# Patient Record
Sex: FEMALE | Race: WHITE | Employment: UNEMPLOYED | URBAN - METROPOLITAN AREA
[De-identification: names, ages, dates, MRNs, and addresses within clinical notes are randomized per-mention and may not be internally consistent; named-entity substitution may affect disease eponyms.]

---

## 2022-04-21 LAB
ANTIBODY SCREEN, EXTERNAL: NEGATIVE
CHLAMYDIA, EXTERNAL: NEGATIVE
HBSAG, EXTERNAL: NON REACTIVE
N. GONORRHEA, EXTERNAL: NEGATIVE
RUBELLA, EXTERNAL: NORMAL
T. PALLIDUM, EXTERNAL: NON REACTIVE
TYPE, ABO & RH, EXTERNAL: NORMAL

## 2022-10-26 LAB — GRBS, EXTERNAL: NEGATIVE

## 2022-11-21 ENCOUNTER — HOSPITAL ENCOUNTER (INPATIENT)
Age: 37
LOS: 2 days | Discharge: HOME | DRG: 560 | End: 2022-11-24
Attending: OBSTETRICS & GYNECOLOGY | Admitting: OBSTETRICS & GYNECOLOGY
Payer: COMMERCIAL

## 2022-11-21 PROCEDURE — 75810000275 HC EMERGENCY DEPT VISIT NO LEVEL OF CARE

## 2022-11-22 ENCOUNTER — ANESTHESIA EVENT (OUTPATIENT)
Dept: LABOR AND DELIVERY | Age: 37
DRG: 560 | End: 2022-11-22
Payer: COMMERCIAL

## 2022-11-22 ENCOUNTER — ANESTHESIA (OUTPATIENT)
Dept: LABOR AND DELIVERY | Age: 37
DRG: 560 | End: 2022-11-22
Payer: COMMERCIAL

## 2022-11-22 PROBLEM — Z34.90 PREGNANCY: Status: ACTIVE | Noted: 2022-11-22

## 2022-11-22 LAB
ABO + RH BLD: NORMAL
BASOPHILS # BLD: 0 K/UL (ref 0–0.1)
BASOPHILS NFR BLD: 0 % (ref 0–1)
BLOOD GROUP ANTIBODIES SERPL: NORMAL
DIFFERENTIAL METHOD BLD: ABNORMAL
EOSINOPHIL # BLD: 0.2 K/UL (ref 0–0.4)
EOSINOPHIL NFR BLD: 1 % (ref 0–7)
ERYTHROCYTE [DISTWIDTH] IN BLOOD BY AUTOMATED COUNT: 13.5 % (ref 11.5–14.5)
HCT VFR BLD AUTO: 33.3 % (ref 35–47)
HGB BLD-MCNC: 10.5 G/DL (ref 11.5–16)
IMM GRANULOCYTES # BLD AUTO: 0.1 K/UL (ref 0–0.04)
IMM GRANULOCYTES NFR BLD AUTO: 1 % (ref 0–0.5)
LYMPHOCYTES # BLD: 2.4 K/UL (ref 0.8–3.5)
LYMPHOCYTES NFR BLD: 17 % (ref 12–49)
MCH RBC QN AUTO: 25.9 PG (ref 26–34)
MCHC RBC AUTO-ENTMCNC: 31.5 G/DL (ref 30–36.5)
MCV RBC AUTO: 82 FL (ref 80–99)
MONOCYTES # BLD: 0.9 K/UL (ref 0–1)
MONOCYTES NFR BLD: 6 % (ref 5–13)
NEUTS SEG # BLD: 10.4 K/UL (ref 1.8–8)
NEUTS SEG NFR BLD: 75 % (ref 32–75)
NRBC # BLD: 0 K/UL (ref 0–0.01)
NRBC BLD-RTO: 0 PER 100 WBC
PLATELET # BLD AUTO: 381 K/UL (ref 150–400)
PMV BLD AUTO: 9.9 FL (ref 8.9–12.9)
RBC # BLD AUTO: 4.06 M/UL (ref 3.8–5.2)
SPECIMEN EXP DATE BLD: NORMAL
WBC # BLD AUTO: 14 K/UL (ref 3.6–11)

## 2022-11-22 PROCEDURE — G0378 HOSPITAL OBSERVATION PER HR: HCPCS

## 2022-11-22 PROCEDURE — 75410000002 HC LABOR FEE PER 1 HR

## 2022-11-22 PROCEDURE — 74011000250 HC RX REV CODE- 250: Performed by: ADVANCED PRACTICE MIDWIFE

## 2022-11-22 PROCEDURE — 74011250637 HC RX REV CODE- 250/637: Performed by: OBSTETRICS & GYNECOLOGY

## 2022-11-22 PROCEDURE — 75410000003 HC RECOV DEL/VAG/CSECN EA 0.5 HR

## 2022-11-22 PROCEDURE — 74011250636 HC RX REV CODE- 250/636: Performed by: ADVANCED PRACTICE MIDWIFE

## 2022-11-22 PROCEDURE — 0HQ9XZZ REPAIR PERINEUM SKIN, EXTERNAL APPROACH: ICD-10-PCS | Performed by: OBSTETRICS & GYNECOLOGY

## 2022-11-22 PROCEDURE — 77030014125 HC TY EPDRL BBMI -B: Performed by: STUDENT IN AN ORGANIZED HEALTH CARE EDUCATION/TRAINING PROGRAM

## 2022-11-22 PROCEDURE — 65410000002 HC RM PRIVATE OB

## 2022-11-22 PROCEDURE — 77030021125

## 2022-11-22 PROCEDURE — 36415 COLL VENOUS BLD VENIPUNCTURE: CPT

## 2022-11-22 PROCEDURE — 74011000250 HC RX REV CODE- 250: Performed by: STUDENT IN AN ORGANIZED HEALTH CARE EDUCATION/TRAINING PROGRAM

## 2022-11-22 PROCEDURE — 74011250636 HC RX REV CODE- 250/636: Performed by: STUDENT IN AN ORGANIZED HEALTH CARE EDUCATION/TRAINING PROGRAM

## 2022-11-22 PROCEDURE — 99285 EMERGENCY DEPT VISIT HI MDM: CPT

## 2022-11-22 PROCEDURE — 75410000000 HC DELIVERY VAGINAL/SINGLE

## 2022-11-22 PROCEDURE — 74011250636 HC RX REV CODE- 250/636: Performed by: OBSTETRICS & GYNECOLOGY

## 2022-11-22 PROCEDURE — 76060000078 HC EPIDURAL ANESTHESIA

## 2022-11-22 PROCEDURE — 85025 COMPLETE CBC W/AUTO DIFF WBC: CPT

## 2022-11-22 PROCEDURE — 77030003666 HC NDL SPINAL BD -A: Performed by: STUDENT IN AN ORGANIZED HEALTH CARE EDUCATION/TRAINING PROGRAM

## 2022-11-22 PROCEDURE — 74011000250 HC RX REV CODE- 250

## 2022-11-22 PROCEDURE — 86900 BLOOD TYPING SEROLOGIC ABO: CPT

## 2022-11-22 PROCEDURE — 4A1HXCZ MONITORING OF PRODUCTS OF CONCEPTION, CARDIAC RATE, EXTERNAL APPROACH: ICD-10-PCS | Performed by: OBSTETRICS & GYNECOLOGY

## 2022-11-22 RX ORDER — BUPIVACAINE HYDROCHLORIDE 2.5 MG/ML
INJECTION, SOLUTION EPIDURAL; INFILTRATION; INTRACAUDAL
Status: COMPLETED
Start: 2022-11-22 | End: 2022-11-22

## 2022-11-22 RX ORDER — NALOXONE HYDROCHLORIDE 0.4 MG/ML
0.4 INJECTION, SOLUTION INTRAMUSCULAR; INTRAVENOUS; SUBCUTANEOUS AS NEEDED
Status: DISCONTINUED | OUTPATIENT
Start: 2022-11-22 | End: 2022-11-22 | Stop reason: HOSPADM

## 2022-11-22 RX ORDER — SODIUM CHLORIDE 0.9 % (FLUSH) 0.9 %
5-40 SYRINGE (ML) INJECTION EVERY 8 HOURS
Status: DISCONTINUED | OUTPATIENT
Start: 2022-11-22 | End: 2022-11-22 | Stop reason: HOSPADM

## 2022-11-22 RX ORDER — NORETHINDRONE AND ETHINYL ESTRADIOL 0.5-0.035
12.5 KIT ORAL AS NEEDED
Status: DISCONTINUED | OUTPATIENT
Start: 2022-11-22 | End: 2022-11-22 | Stop reason: HOSPADM

## 2022-11-22 RX ORDER — OXYTOCIN/RINGER'S LACTATE 30/500 ML
1-25 PLASTIC BAG, INJECTION (ML) INTRAVENOUS
Status: DISCONTINUED | OUTPATIENT
Start: 2022-11-22 | End: 2022-11-24 | Stop reason: HOSPADM

## 2022-11-22 RX ORDER — DIPHENHYDRAMINE HCL 25 MG
25 CAPSULE ORAL
Status: DISCONTINUED | OUTPATIENT
Start: 2022-11-22 | End: 2022-11-24 | Stop reason: HOSPADM

## 2022-11-22 RX ORDER — AMMONIA 15 % (W/V)
1 AMPUL (EA) INHALATION AS NEEDED
Status: DISCONTINUED | OUTPATIENT
Start: 2022-11-22 | End: 2022-11-24 | Stop reason: HOSPADM

## 2022-11-22 RX ORDER — FENTANYL CITRATE 50 UG/ML
INJECTION, SOLUTION INTRAMUSCULAR; INTRAVENOUS AS NEEDED
Status: DISCONTINUED | OUTPATIENT
Start: 2022-11-22 | End: 2022-11-22 | Stop reason: HOSPADM

## 2022-11-22 RX ORDER — ONDANSETRON 2 MG/ML
4 INJECTION INTRAMUSCULAR; INTRAVENOUS
Status: DISCONTINUED | OUTPATIENT
Start: 2022-11-22 | End: 2022-11-22 | Stop reason: HOSPADM

## 2022-11-22 RX ORDER — LANOLIN ALCOHOL/MO/W.PET/CERES
3 CREAM (GRAM) TOPICAL
Status: DISCONTINUED | OUTPATIENT
Start: 2022-11-22 | End: 2022-11-24 | Stop reason: HOSPADM

## 2022-11-22 RX ORDER — NALBUPHINE HYDROCHLORIDE 20 MG/ML
5 INJECTION, SOLUTION INTRAMUSCULAR; INTRAVENOUS; SUBCUTANEOUS
Status: DISCONTINUED | OUTPATIENT
Start: 2022-11-22 | End: 2022-11-22 | Stop reason: HOSPADM

## 2022-11-22 RX ORDER — NORETHINDRONE AND ETHINYL ESTRADIOL 0.5-0.035
KIT ORAL
Status: COMPLETED
Start: 2022-11-22 | End: 2022-11-22

## 2022-11-22 RX ORDER — LIDOCAINE HYDROCHLORIDE AND EPINEPHRINE 15; 5 MG/ML; UG/ML
INJECTION, SOLUTION EPIDURAL
Status: COMPLETED | OUTPATIENT
Start: 2022-11-22 | End: 2022-11-22

## 2022-11-22 RX ORDER — OXYCODONE AND ACETAMINOPHEN 5; 325 MG/1; MG/1
1 TABLET ORAL
Status: DISCONTINUED | OUTPATIENT
Start: 2022-11-22 | End: 2022-11-24 | Stop reason: HOSPADM

## 2022-11-22 RX ORDER — SODIUM CHLORIDE 0.9 % (FLUSH) 0.9 %
5-40 SYRINGE (ML) INJECTION AS NEEDED
Status: DISCONTINUED | OUTPATIENT
Start: 2022-11-22 | End: 2022-11-22 | Stop reason: HOSPADM

## 2022-11-22 RX ORDER — SIMETHICONE 80 MG
80 TABLET,CHEWABLE ORAL
Status: DISCONTINUED | OUTPATIENT
Start: 2022-11-22 | End: 2022-11-24 | Stop reason: HOSPADM

## 2022-11-22 RX ORDER — IBUPROFEN 400 MG/1
800 TABLET ORAL EVERY 8 HOURS
Status: DISCONTINUED | OUTPATIENT
Start: 2022-11-22 | End: 2022-11-24 | Stop reason: HOSPADM

## 2022-11-22 RX ORDER — BUPIVACAINE HYDROCHLORIDE 2.5 MG/ML
INJECTION, SOLUTION EPIDURAL; INFILTRATION; INTRACAUDAL AS NEEDED
Status: DISCONTINUED | OUTPATIENT
Start: 2022-11-22 | End: 2022-11-22 | Stop reason: HOSPADM

## 2022-11-22 RX ORDER — SODIUM CHLORIDE 0.9 % (FLUSH) 0.9 %
5-40 SYRINGE (ML) INJECTION AS NEEDED
Status: DISCONTINUED | OUTPATIENT
Start: 2022-11-22 | End: 2022-11-24 | Stop reason: HOSPADM

## 2022-11-22 RX ORDER — HYDROCORTISONE 1 %
CREAM (GRAM) TOPICAL AS NEEDED
Status: DISCONTINUED | OUTPATIENT
Start: 2022-11-22 | End: 2022-11-24 | Stop reason: HOSPADM

## 2022-11-22 RX ORDER — NALBUPHINE HYDROCHLORIDE 20 MG/ML
10 INJECTION, SOLUTION INTRAMUSCULAR; INTRAVENOUS; SUBCUTANEOUS
Status: DISCONTINUED | OUTPATIENT
Start: 2022-11-22 | End: 2022-11-22 | Stop reason: HOSPADM

## 2022-11-22 RX ORDER — SODIUM CHLORIDE 0.9 % (FLUSH) 0.9 %
5-40 SYRINGE (ML) INJECTION EVERY 8 HOURS
Status: DISCONTINUED | OUTPATIENT
Start: 2022-11-22 | End: 2022-11-24 | Stop reason: HOSPADM

## 2022-11-22 RX ORDER — ACETAMINOPHEN 325 MG/1
650 TABLET ORAL
Status: DISCONTINUED | OUTPATIENT
Start: 2022-11-22 | End: 2022-11-24 | Stop reason: HOSPADM

## 2022-11-22 RX ORDER — OXYTOCIN/RINGER'S LACTATE 30/500 ML
87.3 PLASTIC BAG, INJECTION (ML) INTRAVENOUS AS NEEDED
Status: DISCONTINUED | OUTPATIENT
Start: 2022-11-22 | End: 2022-11-22 | Stop reason: HOSPADM

## 2022-11-22 RX ORDER — BUPIVACAINE HYDROCHLORIDE 2.5 MG/ML
INJECTION, SOLUTION EPIDURAL; INFILTRATION; INTRACAUDAL
Status: COMPLETED | OUTPATIENT
Start: 2022-11-22 | End: 2022-11-22

## 2022-11-22 RX ORDER — DOCUSATE SODIUM 100 MG/1
100 CAPSULE, LIQUID FILLED ORAL
Status: DISCONTINUED | OUTPATIENT
Start: 2022-11-22 | End: 2022-11-24 | Stop reason: HOSPADM

## 2022-11-22 RX ORDER — HYDROCORTISONE ACETATE PRAMOXINE HCL 2.5; 1 G/100G; G/100G
CREAM TOPICAL AS NEEDED
Status: DISCONTINUED | OUTPATIENT
Start: 2022-11-22 | End: 2022-11-24 | Stop reason: HOSPADM

## 2022-11-22 RX ORDER — OXYTOCIN/RINGER'S LACTATE 30/500 ML
10 PLASTIC BAG, INJECTION (ML) INTRAVENOUS AS NEEDED
Status: DISCONTINUED | OUTPATIENT
Start: 2022-11-22 | End: 2022-11-24 | Stop reason: HOSPADM

## 2022-11-22 RX ORDER — SODIUM CHLORIDE, SODIUM LACTATE, POTASSIUM CHLORIDE, CALCIUM CHLORIDE 600; 310; 30; 20 MG/100ML; MG/100ML; MG/100ML; MG/100ML
125 INJECTION, SOLUTION INTRAVENOUS CONTINUOUS
Status: DISCONTINUED | OUTPATIENT
Start: 2022-11-22 | End: 2022-11-22 | Stop reason: HOSPADM

## 2022-11-22 RX ORDER — MAG HYDROX/ALUMINUM HYD/SIMETH 200-200-20
30 SUSPENSION, ORAL (FINAL DOSE FORM) ORAL
Status: DISCONTINUED | OUTPATIENT
Start: 2022-11-22 | End: 2022-11-22 | Stop reason: HOSPADM

## 2022-11-22 RX ORDER — OXYTOCIN/RINGER'S LACTATE 30/500 ML
10 PLASTIC BAG, INJECTION (ML) INTRAVENOUS AS NEEDED
Status: COMPLETED | OUTPATIENT
Start: 2022-11-22 | End: 2022-11-22

## 2022-11-22 RX ORDER — FENTANYL CITRATE 50 UG/ML
INJECTION, SOLUTION INTRAMUSCULAR; INTRAVENOUS
Status: COMPLETED
Start: 2022-11-22 | End: 2022-11-22

## 2022-11-22 RX ORDER — OXYTOCIN/RINGER'S LACTATE 30/500 ML
87.3 PLASTIC BAG, INJECTION (ML) INTRAVENOUS AS NEEDED
Status: DISCONTINUED | OUTPATIENT
Start: 2022-11-22 | End: 2022-11-24 | Stop reason: HOSPADM

## 2022-11-22 RX ORDER — FENTANYL/BUPIVACAINE/NS/PF 2-1250MCG
1-16 PREFILLED PUMP RESERVOIR EPIDURAL CONTINUOUS
Status: DISCONTINUED | OUTPATIENT
Start: 2022-11-22 | End: 2022-11-22 | Stop reason: HOSPADM

## 2022-11-22 RX ADMIN — SODIUM CHLORIDE, POTASSIUM CHLORIDE, SODIUM LACTATE AND CALCIUM CHLORIDE 1000 ML: 600; 310; 30; 20 INJECTION, SOLUTION INTRAVENOUS at 00:00

## 2022-11-22 RX ADMIN — OXYTOCIN 10000 MILLI-UNITS: 10 INJECTION, SOLUTION INTRAMUSCULAR; INTRAVENOUS at 17:09

## 2022-11-22 RX ADMIN — IBUPROFEN 800 MG: 400 TABLET, FILM COATED ORAL at 18:45

## 2022-11-22 RX ADMIN — SODIUM CHLORIDE, PRESERVATIVE FREE 10 ML: 5 INJECTION INTRAVENOUS at 02:00

## 2022-11-22 RX ADMIN — Medication 25 MG: at 03:05

## 2022-11-22 RX ADMIN — Medication 10 ML/HR: at 06:44

## 2022-11-22 RX ADMIN — Medication 2 MILLI-UNITS/MIN: at 11:52

## 2022-11-22 RX ADMIN — EPHEDRINE SULFATE 12.5 MG: 50 INJECTION INTRAVENOUS at 05:49

## 2022-11-22 RX ADMIN — FENTANYL CITRATE 100 MCG: 50 INJECTION, SOLUTION INTRAMUSCULAR; INTRAVENOUS at 05:35

## 2022-11-22 RX ADMIN — ONDANSETRON 4 MG: 2 INJECTION INTRAMUSCULAR; INTRAVENOUS at 05:48

## 2022-11-22 RX ADMIN — Medication 10 ML/HR: at 14:11

## 2022-11-22 RX ADMIN — NORETHINDRONE AND ETHINYL ESTRADIOL 12.5 MG: KIT ORAL at 05:49

## 2022-11-22 RX ADMIN — SODIUM CHLORIDE, POTASSIUM CHLORIDE, SODIUM LACTATE AND CALCIUM CHLORIDE 125 ML/HR: 600; 310; 30; 20 INJECTION, SOLUTION INTRAVENOUS at 02:00

## 2022-11-22 RX ADMIN — LIDOCAINE HYDROCHLORIDE,EPINEPHRINE BITARTRATE 2 ML: 15; .005 INJECTION, SOLUTION EPIDURAL; INFILTRATION; INTRACAUDAL; PERINEURAL at 05:35

## 2022-11-22 RX ADMIN — NALBUPHINE HYDROCHLORIDE 10 MG: 20 INJECTION, SOLUTION INTRAMUSCULAR; INTRAVENOUS; SUBCUTANEOUS at 03:05

## 2022-11-22 RX ADMIN — SODIUM CHLORIDE, POTASSIUM CHLORIDE, SODIUM LACTATE AND CALCIUM CHLORIDE 125 ML/HR: 600; 310; 30; 20 INJECTION, SOLUTION INTRAVENOUS at 12:44

## 2022-11-22 RX ADMIN — BUPIVACAINE HYDROCHLORIDE 2 ML: 2.5 INJECTION, SOLUTION EPIDURAL; INFILTRATION; INTRACAUDAL; PERINEURAL at 05:35

## 2022-11-22 RX ADMIN — SODIUM CHLORIDE, POTASSIUM CHLORIDE, SODIUM LACTATE AND CALCIUM CHLORIDE 125 ML/HR: 600; 310; 30; 20 INJECTION, SOLUTION INTRAVENOUS at 09:00

## 2022-11-22 RX ADMIN — BUPIVACAINE HYDROCHLORIDE 0.5 ML: 2.5 INJECTION, SOLUTION EPIDURAL; INFILTRATION; INTRACAUDAL at 05:35

## 2022-11-22 NOTE — H&P
Observation History and Physical    Patient: Sami Estrada MRN: [id number]  SSN: [id number]    YOB: 1985  Age: 40 y.o. Sex: female      Subjective:      Sami Estrada is a 41 yo  at 39w5d with an SEYMOUR of 22. She presents to observation for therapeutic rest. Was seen in the [facility] for contractions and SVE was unchanged after 2 hours. Reports she has had contractions over last three nights. Ctx have been irregular, sometimes 10 to 45 min in between. Tonight they have gotten more regular, about every 7 min and an 8/10 on the pain scale. Denies LOF and VB. Endorses good fetal movement. Prenatal care has been received at [address] with Dr Denise King. Pregnancy complicated by a history of a  with G1 at 36 weeks for fetal distress (pt was also COVID positive at the time). Pt desires TOLAC, has been educated in office on risks (including but not limited to maternal and/or fetal death) and desires to proceed with TOLAC. Pt also with a history of postpartum depression, depression and anxiety, not currently on any medications for management. Pt is also AMA- declined genetic testing with this pregnancy. Pt is rubella non immune and will need MMR postpartum. Pt also anemic- not taking supplement. Past Medical History:   Diagnosis Date    Anemia     no t taking iron    Postpartum depression     Trauma      Past Surgical History:   Procedure Laterality Date    HX  SECTION        Family History   Problem Relation Age of Onset    No Known Problems Mother     No Known Problems Father      Social History     Tobacco Use    Smoking status: Never     Passive exposure: Never    Smokeless tobacco: Not on file   Substance Use Topics    Alcohol use: Never      Prior to Admission medications    Medication Sig Start Date End Date Taking? Authorizing Provider   prenatal vit no.124/iron/folic (PRENATAL VITAMIN PO) Take  by mouth.    Yes Provider, Historical        Allergies   Allergen Reactions    Sulfa (Sulfonamide Antibiotics) Rash        Review of Systems   Constitutional: Negative. HENT: Negative. Eyes: Negative. Respiratory: Negative. Cardiovascular: Negative. Gastrointestinal:  Positive for abdominal pain. Endocrine: Negative. Genitourinary: Negative. Musculoskeletal: Negative. Skin: Negative. Allergic/Immunologic: Negative. Neurological: Negative. Hematological: Negative. Psychiatric/Behavioral: Negative. Objective:     Vitals:    11/21/22 2257   BP: 127/85   Pulse: 90   Resp: 16   Temp: 97.9 °F (36.6 °C)   SpO2: 97%   Weight: 78.5 kg (173 lb)   Height: 5' 5\" (1.651 m)           Physical Exam  Vitals and nursing note reviewed. Exam conducted with a chaperone present. Constitutional:       Appearance: Normal appearance. She is normal weight. HENT:      Head: Normocephalic and atraumatic. Nose: Nose normal.      Mouth/Throat:      Mouth: Mucous membranes are moist.      Pharynx: Oropharynx is clear. Eyes:      Extraocular Movements: Extraocular movements intact. Cardiovascular:      Rate and Rhythm: Normal rate and regular rhythm. Pulses: Normal pulses. Heart sounds: Normal heart sounds. Pulmonary:      Effort: Pulmonary effort is normal.      Breath sounds: Normal breath sounds. Abdominal:      Comments: Ctx mild to palpation, resting tone soft   Genitourinary:     Comments: SVE: posterior, 3/60/-2, intact, vertex     Repeat SVE unchanged  Musculoskeletal:         General: Normal range of motion. Cervical back: Normal range of motion and neck supple. Skin:     General: Skin is warm and dry. Capillary Refill: Capillary refill takes less than 2 seconds. Neurological:      General: No focal deficit present. Mental Status: She is alert and oriented to person, place, and time. Mental status is at baseline.    Psychiatric:         Mood and Affect: Mood normal.         Behavior: Behavior normal. Thought Content: Thought content normal.         Judgment: Judgment normal.      NST: Monitored for 40 minutes, reactive, cat 1, baseline 135, positive accels, no decels, moderate variability, ctx q 2-5 min, strong to palpation, resting tone soft    Assessment:     Hospital Problems  Never Reviewed            Codes Class Noted POA    Pregnancy ICD-10-CM: Z34.90  ICD-9-CM: V22.2  2022 Unknown         Maternal exhaustion  Prodromal labor  Hx     Plan:     Admit to observation  IV, Phenergan/nubain for therapeutic rest.  Recheck cervix in AM and determine POC.   Continuous fetal and toco monitoring  Maternal vitals per protocol    Signed By: Ivan Ann CNM     2022

## 2022-11-22 NOTE — ED TRIAGE NOTES
2256: Patient of Valeria Casanova MD (, 39w6d) arrives ambulatory with significant other with complaints of contractions that started three nights ago; however have intensified and increased in regularity since this evening. Patient denies loss of fluid or vaginal bleeding, RUQ pain, visual disturbances or headaches at this time. Patient endorses fetal movement. VSS. Patient oriented to room and call bell within reach. 2300: CNM made aware of patient arrival.    2310: CNM at bedside. SVE remains unchanged (3/60/-2). Orders to give 1 L of fluid and PO hydration and recheck cervix after two hours. Continue with continuous monitoring. 0125: CNM at bedside. SVE remains unchanged. CNM discussing whether patient to be discharged home or admit for therapeutic rest. Patient desires to be admitted for therapeutic rest and repeat SVE in AM.    0247: Patient transferred to LD 6 for therapeutic rest. This RN to resume care. 6670: Phenergan and Nubain given. (See MAR). 0400: Patient resting comfortably at this time. 46: Patient in increased pain that is \"unbearable\". RN at bedside educating patient on IV medication vs epidural placement. Patient decides on epidural placement at this time. Patient up to void and IV fluid bolus begun at this time. 0500Prudence MD Cassie made aware. 2671Prudenlina Matthews MD at bedside for epidural placement. Timeout complete. 0540: Epidural in place. Patient tolerated well. BP cycling per unit routine. 5730: Patient placed on L side. 56: Patient reports gush of fluid. Nitrazine positive. Clear moderate amount of fluid.    0600: Patient placed on R side. 0700: Patient turned on L side due to FHR.    0701: IV fluid bolus begun for FHR.    0702: Patient in hands and knees due to FHR.    0703: Oxygen started due to [address].    0705: SVE: /-1 by Clinton Roy RN.    0715: RN remaining at bedside d/t FHR. 0730:  Bedside and Verbal shift change report given to Christopher Hirsch RN (oncoming nurse) by Ariadne Cespedes RN (offgoing nurse). Report included the following information SBAR, Kardex, Intake/Output, MAR, and Recent Results.

## 2022-11-22 NOTE — ANESTHESIA POSTPROCEDURE EVALUATION
Post-Anesthesia Evaluation and Assessment    Patient: Aisha Cantu MRN: [id number]  SSN: [id number]    YOB: 1985  Age: 40 y.o. Sex: female      I have evaluated the patient and they are stable and ready for discharge from the PACU. Cardiovascular Function/Vital Signs  Visit Vitals  /61   Pulse (!) 120   Temp 36.7 °C (98 °F)   Resp 16   Ht 5' 5\" (1.651 m)   Wt 78.5 kg (173 lb)   SpO2 100%   Breastfeeding No   BMI 28.79 kg/m²       Patient is status post * No anesthesia type entered * anesthesia for * No procedures listed *. Nausea/Vomiting: None    Postoperative hydration reviewed and adequate. Pain:  Pain Scale 1: Numeric (0 - 10) (11/22/22 1718)  Pain Intensity 1: 0 (11/22/22 1817)   Managed    Neurological Status:   Neuro (WDL): Within Defined Limits (11/22/22 1718)   At baseline    Mental Status, Level of Consciousness: Alert and  oriented to person, place, and time    Pulmonary Status:   O2 Device: None (Room air) (11/21/22 2257)   Adequate oxygenation and airway patent    Complications related to anesthesia: None    Post-anesthesia assessment completed. No concerns      Signed By: Gene Fry DO     November 22, 2022                * No procedures listed *.     CSE    <BSHSIANPOST>    INITIAL Post-op Vital signs:   Vitals Value Taken Time   /61 11/22/22 1832   Temp     Pulse 120 11/22/22 1832   Resp     SpO2

## 2022-11-22 NOTE — H&P
Inpatient History and Physical    Patient: Chandu Nath MRN: [id number]  SSN: [id number]    YOB: 1985  Age: 40 y.o. Sex: female      Late entry due to busy unit    Subjective:      Chandu Nath is a 41 yo  at 39w5d with an SEYMOUR of 22. She is being admitted to inpatient from observation for labor. Pt was in observation for therapeutic rest. Because see was seen in the [facility] for contractions and SVE was unchanged after 2 hours. Pt was able to get a brief amount of rest. Now pt is more active and would like epidural. Reports she has had contractions over last three nights. Ctx have been irregular, sometimes 10 to 45 min in between. Tonight they have gotten more regular, about every 7 min and an 8/10 on the pain scale. Denies LOF and VB. Endorses good fetal movement. Prenatal care has been received at [facility] with Dr Osmar Willams. Pregnancy complicated by a history of a  with G1 at 36 weeks for fetal distress (pt was also COVID positive at the time). Pt desires TOLAC, has been educated in office on risks (including but not limited to maternal and/or fetal death) and desires to proceed with TOLAC. Pt also with a history of postpartum depression, depression and anxiety, not currently on any medications for management. Pt is also AMA- declined genetic testing with this pregnancy. Pt is rubella non immune and will need MMR postpartum. Pt also anemic- not taking supplement.     Past Medical History:   Diagnosis Date    Anemia     no t taking iron    Postpartum depression     Trauma      Past Surgical History:   Procedure Laterality Date    HX  SECTION        Family History   Problem Relation Age of Onset    No Known Problems Mother     No Known Problems Father      Social History     Tobacco Use    Smoking status: Never     Passive exposure: Never    Smokeless tobacco: Not on file   Substance Use Topics    Alcohol use: Never      Prior to Admission medications    Medication Sig Start Date End Date Taking? Authorizing Provider   prenatal vit no.124/iron/folic (PRENATAL VITAMIN PO) Take  by mouth. Yes Provider, Historical        Allergies   Allergen Reactions    Sulfa (Sulfonamide Antibiotics) Rash        Review of Systems   Constitutional: Negative. HENT: Negative. Eyes: Negative. Respiratory: Negative. Cardiovascular: Negative. Gastrointestinal:  Positive for abdominal pain. Endocrine: Negative. Genitourinary: Negative. Musculoskeletal: Negative. Skin: Negative. Allergic/Immunologic: Negative. Neurological: Negative. Hematological: Negative. Psychiatric/Behavioral: Negative. Objective:     Vitals:    11/22/22 0609 11/22/22 0624 11/22/22 0625 11/22/22 0629   BP: (!) 108/51 (!) 103/58 (!) 101/52 103/63   Pulse: (!) 108 87 93 (!) 108   Resp:       Temp:       SpO2: 100% 98%  100%   Weight:       Height:            Physical Exam  Vitals and nursing note reviewed. Exam conducted with a chaperone present. Constitutional:       Appearance: Normal appearance. She is normal weight. HENT:      Head: Normocephalic and atraumatic. Nose: Nose normal.      Mouth/Throat:      Mouth: Mucous membranes are moist.      Pharynx: Oropharynx is clear. Eyes:      Extraocular Movements: Extraocular movements intact. Cardiovascular:      Rate and Rhythm: Normal rate and regular rhythm. Pulses: Normal pulses. Heart sounds: Normal heart sounds. Pulmonary:      Effort: Pulmonary effort is normal.      Breath sounds: Normal breath sounds. Abdominal:      Comments: Ctx mild to palpation, resting tone soft   Genitourinary:     Comments: SVE: posterior, 3/60/-2, intact, vertex     Repeat SVE unchanged in EMILY  Per RN pt 8/90/-1 @ 9858    Musculoskeletal:         General: Normal range of motion. Cervical back: Normal range of motion and neck supple. Skin:     General: Skin is warm and dry.       Capillary Refill: Capillary refill takes less than 2 seconds. Neurological:      General: No focal deficit present. Mental Status: She is alert and oriented to person, place, and time. Mental status is at baseline. Psychiatric:         Mood and Affect: Mood normal.         Behavior: Behavior normal.         Thought Content: Thought content normal.         Judgment: Judgment normal.      NST: Monitored for 40 minutes, reactive, cat 1, baseline 135, positive accels, no decels, moderate variability, ctx q 2-5 min, strong to palpation, resting tone soft    Assessment:     Hospital Problems  Never Reviewed            Codes Class Noted POA    Pregnancy ICD-10-CM: Z34.90  ICD-9-CM: V22.2  2022 Unknown         Active labor  TOLAC  Hx   B Positive  Rubella Non-immune  Hx depression and anxiety  AMA  Hep B and HIV Neg    Plan:     Admit to l and d inpatient. Epidural for pain control. Pt aware of risks of TOLAC and desires to continue. Recheck cervix with maternal or fetal indication.    Maternal and fetal monitoring per protocol  Care handed to primary team at 0800    Signed By: Mendel Del Castillo CNM     2022

## 2022-11-22 NOTE — L&D DELIVERY NOTE
Delivery Summary  Patient: Brandon Rushing             Circumcision:   NA-female  Additional Delivery Comments - Pt pushed x 1 hour with delivery of infant's head in OA position, no nuchal cord, easy shoulder and delivery remainder of infant's body. Baby to mother's abdomen and bulb suctioned. Cord clamping delayed x 2 min. Spontaneous placenta after 5 mins. Midline 1st degree laceration repaired with 3-0 vicryl. Fundus firm and hemostasis excellent.      Information for the patient's :  [facility] [[id number]]     Delivery Type: , Spontaneous   Delivery Date: 2022   Delivery Time: 5:03 PM     Birth Weight:       Sex:  female  Delivery Clinician:  Brooklyn Hirsch   Gestational Age: 37w11d    Presentation: Vertex   Position:             Apgars were 9  and 9      Resuscitation Method: Tactile Stimulation;Suctioning-bulb     Meconium Stained: None    Living Status: Living       Placenta Date/Time: 2022  5:09 PM   Placenta Removal: Spontaneous   Placenta Appearance: Normal    Cord Information: 3 Vessels    Cord Events: None       Disposition of Cord Blood: Discard    Blood Gases Sent?:  No     Cord pH:  none    Episiotomy:    Laceration(s): 1st     Estimated Blood Loss (ml): 300ml clinically, QBL pending    Labor Events  Method:       Augmentation: Oxytocin   Cervical Ripening:     None        Operative Vaginal Delivery - none

## 2022-11-22 NOTE — PROGRESS NOTES
S: Pt seen, examined and chart reviewed. Comfortable now with epidural.  Feeling rectal pressure. O: Visit Vitals  /63   Pulse (!) 108   Temp 97.5 °F (36.4 °C)   Resp 16   Ht 5' 5\" (1.651 m)   Wt 78.5 kg (173 lb)   SpO2 100%   Breastfeeding No   BMI 28.79 kg/m²     Gen - NAD  Resp - nl effort  Abd - gravid, non-tender, OP appearance  Cervix - 7/-1    A/P:  at 39w6d in active labor, h/o C/S x 1 for [place] [address] [facility] [place], desires TOLAC. H/o anxiety/depression - no current meds.     - TOLAC risks/benefits reviewed, good progress thus far  - spinning babies maneuvers to help with OP presentation  - CEFM

## 2022-11-22 NOTE — ED PROVIDER NOTES
Catherine Gowers is a 39 yo  at 39w5d with an SEYMOUR of 22. She presents to the [facility] for contractions. Reports she has had contractions over last three nights. Ctx have been irregular, sometimes 10 to 45 min in between. Tonight they have gotten more regular, about every 7 min and an 8/10 on the pain scale. Denies LOF and VB. Endorses good fetal movement. Prenatal care has been received at [facility] with Dr Herbert Marie. Pregnancy complicated by a history of a  with G1 at 36 weeks for fetal distress (pt was also COVID positive at the time). Pt desires TOLAC, has been educated in office on risks (including but not limited to maternal and/or fetal death) and desires to proceed with TOLAC. Pt also with a history of postpartum depression, depression and anxiety, not currently on any medications for management. Pt is also AMA- declined genetic testing with this pregnancy. Pt is rubella non immune and will need MMR postpartum. Pt also anemic- not taking supplement.     Contractions        Past Medical History:   Diagnosis Date    Anemia     no t taking iron    Postpartum depression     Trauma        Past Surgical History:   Procedure Laterality Date    HX  SECTION           Family History:   Problem Relation Age of Onset    No Known Problems Mother     No Known Problems Father        Social History     Socioeconomic History    Marital status: Not on file     Spouse name: Not on file    Number of children: Not on file    Years of education: Not on file    Highest education level: Not on file   Occupational History    Not on file   Tobacco Use    Smoking status: Never     Passive exposure: Never    Smokeless tobacco: Not on file   Vaping Use    Vaping Use: Every day   Substance and Sexual Activity    Alcohol use: Never    Drug use: Yes     Frequency: 7.0 times per week     Types: Marijuana     Comment: last used today    Sexual activity: Not on file   Other Topics Concern     Service No    Blood Transfusions No    Caffeine Concern No    Occupational Exposure No    Hobby Hazards No    Sleep Concern No    Stress Concern No    Weight Concern No    Special Diet No    Back Care No    Exercise No    Bike Helmet No    Seat Belt No    Self-Exams No   Social History Narrative    Not on file     Social Determinants of Health     Financial Resource Strain: Not on file   Food Insecurity: Not on file   Transportation Needs: Not on file   Physical Activity: Not on file   Stress: Not on file   Social Connections: Not on file   Intimate Partner Violence: Not on file   Housing Stability: Not on file         ALLERGIES: Sulfa (sulfonamide antibiotics)    Review of Systems   Constitutional: Negative. HENT: Negative. Eyes: Negative. Respiratory: Negative. Cardiovascular: Negative. Gastrointestinal:  Positive for abdominal pain. Endocrine: Negative. Genitourinary: Negative. Musculoskeletal: Negative. Skin: Negative. Allergic/Immunologic: Negative. Neurological: Negative. Hematological: Negative. Psychiatric/Behavioral: Negative. Vitals:    11/21/22 2257   BP: 127/85   Resp: 16   Temp: 97.9 °F (36.6 °C)   SpO2: 97%   Weight: 78.5 kg (173 lb)   Height: 5' 5\" (1.651 m)            Physical Exam  Vitals and nursing note reviewed. Exam conducted with a chaperone present. Constitutional:       Appearance: Normal appearance. She is normal weight. HENT:      Head: Normocephalic and atraumatic. Nose: Nose normal.      Mouth/Throat:      Mouth: Mucous membranes are moist.      Pharynx: Oropharynx is clear. Eyes:      Extraocular Movements: Extraocular movements intact. Cardiovascular:      Rate and Rhythm: Normal rate and regular rhythm. Pulses: Normal pulses. Heart sounds: Normal heart sounds. Pulmonary:      Effort: Pulmonary effort is normal.      Breath sounds: Normal breath sounds.    Abdominal:      Comments: Ctx mild to palpation, resting tone soft   Genitourinary:     Comments: SVE: posterior, 3/60/-2, intact, vertex    Repeat SVE unchanged  Musculoskeletal:         General: Normal range of motion. Cervical back: Normal range of motion and neck supple. Skin:     General: Skin is warm and dry. Capillary Refill: Capillary refill takes less than 2 seconds. Neurological:      General: No focal deficit present. Mental Status: She is alert and oriented to person, place, and time. Mental status is at baseline. Psychiatric:         Mood and Affect: Mood normal.         Behavior: Behavior normal.         Thought Content: Thought content normal.         Judgment: Judgment normal.      NST: Monitored for 40 minutes, reactive, cat 1, baseline 135, positive accels, no decels, moderate variability, ctx q 2-5 min, strong to palpation, resting tone soft    MDM     Amount and/or Complexity of Data Reviewed  Decide to obtain previous medical records or to obtain history from someone other than the patient: yes  Review and summarize past medical records: yes  Discuss the patient with other providers: yes (Dr Lorin Liriano)  Independent visualization of images, tracings, or specimens: yes (NST)    Risk of Complications, Morbidity, and/or Mortality  Presenting problems: moderate  Diagnostic procedures: moderate  Management options: moderate    Patient Progress  Patient progress: stable    ED Course as of 11/22/22 0143   Mon Nov 21, 2022   2321 Admit to EMILY  NST: Continuous monitoring  SVE  IV, 1 L LR bolus, po hydration  Recheck cervix 2 hours  Reviewed fetal heart rate strip with Dr Lorin Liriano, to do bolus and continuous monitoring. [LA]   Tue Nov 22, 2022   0137 Repeat SVE unchanged.  Discussed option to d/c home to await a more active phase or to admit for therapeutic rest and then repeat SVE in AM.   Pt discussed with  and desires to admit for therapeutic rest and recheck in AM.    [LA]      ED Course User Index  [LA] Lio Dacosta CNM

## 2022-11-22 NOTE — ANESTHESIA PREPROCEDURE EVALUATION
Relevant Problems   No relevant active problems       Anesthetic History   No history of anesthetic complications            Review of Systems / Medical History  Patient summary reviewed, nursing notes reviewed and pertinent labs reviewed    Pulmonary  Within defined limits                 Neuro/Psych   Within defined limits           Cardiovascular  Within defined limits                     GI/Hepatic/Renal  Within defined limits              Endo/Other  Within defined limits           Other Findings              Physical Exam    Airway  Mallampati: II  TM Distance: 4 - 6 cm  Neck ROM: normal range of motion   Mouth opening: Normal     Cardiovascular    Rhythm: regular  Rate: normal         Dental  No notable dental hx       Pulmonary  Breath sounds clear to auscultation               Abdominal  GI exam deferred       Other Findings            Anesthetic Plan    ASA: 2  Anesthesia type: CSE            Anesthetic plan and risks discussed with: Patient

## 2022-11-22 NOTE — PROGRESS NOTES
0733-bedside report from [facility], pt in knee chest with oxygen on, IV bolus running, shifted pt into Trendelenberg also  0745-pt having some rectal pressure  0800-SVE Clipp, /1  0805-Straight cath done. 1000 cc  0806-sidelying pelvic release left  0815-sidelying pelvic release right  0832-Tburg, peanut ball, flying cowgirl  0934-runners lunge, left side  0955-straight cath done, 450 cc  1000-Walchers maneuver  1009-right lateral  1016-high fowlers  1143-SVE /0  1152-spoke with Dr Antonieta Beckwith. Pitocin started  1155-sidelying with peanut ball, tburg  1235-SVE Clipp. 1-0  1240-epidural redosed by Dr Yvonne Sawyer  1335-straight cath done, 500 cc  1345-runners lunge left side  1435-right side tburg, peanut ball  1540-straight cath done   1601-pushing started, RN at bedside, continuously monitoring FHR tracing  1630-RN at bedside, continuously monitoring FHR tracing  1700-RN at bedside, continuously monitoring FHR tracing  1703- baby girl by Dr Antonieta Beckwith, skin to skin  1820-straight cath done  -TRANSFER - OUT REPORT:    Verbal report given to NOLAN RYAN(name) on Erick Lopes  being transferred to [facility](unit) for routine progression of care       Report consisted of patients Situation, Background, Assessment and   Recommendations(SBAR). Information from the following report(s) SBAR, Intake/Output, MAR, and Recent Results was reviewed with the receiving nurse. Lines:   Peripheral IV 22 Left;Posterior Forearm (Active)   Site Assessment Clean, dry, & intact 22   Phlebitis Assessment 0 22   Infiltration Assessment 0 22   Dressing Status Clean, dry, & intact 22   Dressing Type Transparent;Tape 22   Hub Color/Line Status Infusing 22   Action Taken Open ports on tubing capped 22   Alcohol Cap Used Yes 22        Opportunity for questions and clarification was provided.       Patient transported with:   Registered Nurse

## 2022-11-22 NOTE — ANESTHESIA PROCEDURE NOTES
CSE Block    Start time: 11/22/2022 5:25 AM  End time: 11/22/2022 5:35 AM  Performed by: Judd Diaz DO  Authorized by: Judd Diaz DO     Pre-Procedure  Indications: primary anesthetic    preanesthetic checklist: patient identified, risks and benefits discussed, anesthesia consent, site marked, patient being monitored, timeout performed and fire risk safety assessment completed and verbalized    Timeout Time: 05:25 EST      Procedure:   Patient Position:  Seated  Prep Region:  Lumbar  Prep: DuraPrep    Location:  L3-4    Epidural Needle:   Needle Type:  Tuohy  Needle Gauge:  17 G  Injection Technique:  Loss of resistance using air  Attempts:  1    Spinal Needle:   Needle Type:  Pencil-tip  Needle Gauge:  25 G    Catheter:   Catheter Type:  Standard  Catheter Size:  19 G  Catheter at Skin Depth (cm):  9  Depth in Epidural Space (cm):  5  Events: no blood with aspiration, no cerebrospinal fluid with aspiration, no paresthesia, negative aspiration test and CSF confirmed    Test Dose:  Bupivacaine (PF) (MARCAINE) 0.25% Epidural - Epidural   2 mL - 11/22/2022 5:35:00 AM  lidocaine-EPINEPHrine (XYLOCAINE) 1.5 %-1:200,000 injection Epidural - Epidural   2 mL - 11/22/2022 5:35:00 AM  Med Admin time: 11/22/2022 5:35 AM    Assessment:   Catheter Secured:  Tegaderm and tape  Insertion:  Uncomplicated  Patient tolerance:  Patient tolerated the procedure well with no immediate complications

## 2022-11-23 PROCEDURE — 74011250637 HC RX REV CODE- 250/637: Performed by: OBSTETRICS & GYNECOLOGY

## 2022-11-23 PROCEDURE — 65410000002 HC RM PRIVATE OB

## 2022-11-23 RX ORDER — IBUPROFEN 800 MG/1
800 TABLET ORAL EVERY 8 HOURS
Qty: 30 TABLET | Refills: 0 | Status: SHIPPED | OUTPATIENT
Start: 2022-11-23

## 2022-11-23 RX ADMIN — DOCUSATE SODIUM 100 MG: 100 CAPSULE, LIQUID FILLED ORAL at 10:07

## 2022-11-23 RX ADMIN — OXYCODONE AND ACETAMINOPHEN 1 TABLET: 5; 325 TABLET ORAL at 14:46

## 2022-11-23 RX ADMIN — IBUPROFEN 800 MG: 400 TABLET, FILM COATED ORAL at 10:08

## 2022-11-23 RX ADMIN — OXYCODONE AND ACETAMINOPHEN 1 TABLET: 5; 325 TABLET ORAL at 05:58

## 2022-11-23 RX ADMIN — IBUPROFEN 800 MG: 400 TABLET, FILM COATED ORAL at 02:04

## 2022-11-23 RX ADMIN — OXYCODONE AND ACETAMINOPHEN 1 TABLET: 5; 325 TABLET ORAL at 20:06

## 2022-11-23 RX ADMIN — IBUPROFEN 800 MG: 400 TABLET, FILM COATED ORAL at 20:06

## 2022-11-23 RX ADMIN — DIMETHICONE 80 MG: 80 TABLET, CHEWABLE ORAL at 23:20

## 2022-11-23 RX ADMIN — DIMETHICONE 80 MG: 80 TABLET, CHEWABLE ORAL at 16:26

## 2022-11-23 RX ADMIN — OXYCODONE AND ACETAMINOPHEN 1 TABLET: 5; 325 TABLET ORAL at 10:08

## 2022-11-23 NOTE — PROGRESS NOTES
Bedside shift change report given to PASCALE Christianson and LYNDA Aguirre (oncoming nurse) by Nilo Corrales (offgoing nurse). Report included the following information SBAR.

## 2022-11-23 NOTE — DISCHARGE INSTRUCTIONS
Postpartum Support Groups  We know that all of us are dealing with a tremendous amount of uncertainty, confusion and disruption to our daily lives, which may result in increased anxiety, depression and fear. If you are feeling unsettled or worse, please know that we are here to help. During this time of increased caution and care for one another, [facility] [place] ([employer]) is offering virtual support groups to ALL MOTHERS in [place] regardless of the age of your child/children as a way to help weather this emotional storm together. Social support is an important part of self-care during this time of physical distancing. Virtual postpartum support group meetings available at www. postpartumva.org  Warm Line: 686.331.4977    Breastfeeding Support Groups    and  of each month at [address] [place] [address]   and  of each month at [address] [facility] at www.[employer] under the \"About Us\" and \"Classes and Events tabs\"       After Your Delivery (the Postpartum Period): Care Instructions  Overview     Congratulations on the birth of your baby. Like pregnancy, the  period can be a time of excitement, tiesha, and exhaustion. You may look at your wondrous little baby and feel happy. You may also be overwhelmed by your new sleep hours and new responsibilities. At first, babies often sleep during the days and are awake at night. They do not have a pattern or routine. They may make sudden gasps, jerk themselves awake, or look like they have crossed eyes. These are all normal, and they may even make you smile. In these first weeks after delivery, try to take good care of yourself. It may take 4 to 6 weeks to feel like yourself again, and possibly longer if you had a  birth. You will likely feel very tired for several weeks. Your days will be full of ups and downs, but lots of tiesha as well. Follow-up care is a key part of your treatment and safety.  Be sure to make and go to all appointments, and call your doctor if you are having problems. It's also a good idea to know your test results and keep a list of the medicines you take. How can you care for yourself at home? Take care of your body after delivery  Use pads instead of tampons for the bloody flow that may last as long as 2 weeks. Ease cramps with ibuprofen (Advil, Motrin). Ease soreness of hemorrhoids and the area between your vagina and rectum with ice compresses or witch hazel pads. Ease constipation by drinking lots of fluid and eating high-fiber foods. Ask your doctor about over-the-counter stool softeners. Cleanse yourself with a gentle squeeze of warm water from a bottle instead of wiping with toilet paper. Take a sitz bath in warm water several times a day. Wear a good nursing bra. Ease sore and swollen breasts with warm, wet washcloths. If you aren't breastfeeding, use ice rather than heat for breast soreness. Your period may not start for several months if you are breastfeeding. You may bleed more, and longer at first, than you did before you got pregnant. Wait until you are healed (about 4 to 6 weeks) before you have sex. Ask your doctor when it is okay for you to have sex. Try not to travel with your baby for 5 or 6 weeks. If you take a long car trip, make frequent stops to walk around and stretch. Avoid exhaustion  Rest every day. Try to nap when your baby naps. Ask another adult to be with you for a few days after delivery. Plan for  if you have other children. Stay flexible so you can eat at odd hours and sleep when you need to. Both you and your baby are making new schedules. Plan small trips to get out of the house. Change can make you feel less tired. Ask for help with housework, cooking, and shopping. Remind yourself that your job is to care for your baby. Know about help for postpartum depression  \"Baby blues\" are common for the first 1 to 2 weeks after birth.  You may cry or feel sad or irritable for no reason. Rest whenever you can. Being tired makes it harder to handle your emotions. Go for walks with your baby. Talk to your partner, friends, and family about your feelings. If your symptoms last for more than a few weeks, or if you feel very depressed, ask your doctor for help. Postpartum depression can be treated. Support groups and counseling can help. Sometimes medicine can also help. Stay healthy  Eat healthy foods so you have more energy. If you breastfeed, avoid drugs. If you quit smoking during pregnancy, try to stay smoke-free. If you choose to have a drink now and then, have only one drink, and limit the number of occasions that you have a drink. Wait to breastfeed at least 2 hours after you have a drink to reduce the amount of alcohol the baby may get in the milk. Start daily exercise after 4 to 6 weeks, but rest when you feel tired. Learn exercises to tone your belly. Try Kegel exercises to regain strength in your pelvic muscles. You can do these exercises while you stand or sit. (If doing these exercises causes pain, stop doing them and talk with your doctor.)  Squeeze your muscles as if you were trying not to pass gas. Or squeeze your muscles as if you were stopping the flow of urine. Your belly, legs, and buttocks shouldn't move. Hold the squeeze for 3 seconds, then relax for 5 to 10 seconds. Start with 3 seconds, then add 1 second each week until you are able to squeeze for 10 seconds. Repeat the exercise 10 times a session. Do 3 to 8 sessions a day. Find a class for you and your baby that has an exercise time. If you had a  birth, give yourself a bit more time before you exercise, and be careful. When should you call for help? Share this information with your partner, family, or a friend. They can help you watch for warning signs. Call 911  anytime you think you may need emergency care.  For example, call if:    You have thoughts of harming yourself, your baby, or another person. You passed out (lost consciousness). You have chest pain, are short of breath, or cough up blood. You have a seizure. Call your doctor now or seek immediate medical care if:    You have signs of hemorrhage (too much bleeding), such as:  Heavy vaginal bleeding. This means that you are soaking through one or more pads in an hour. Or you pass blood clots bigger than an egg. Feeling dizzy or lightheaded, or you feel like you may faint. Feeling so tired or weak that you cannot do your usual activities. A fast or irregular heartbeat. New or worse belly pain. You have signs of infection, such as:  A fever. Vaginal discharge that smells bad. New or worse belly pain. You have symptoms of a blood clot in your leg (called a deep vein thrombosis), such as:  Pain in the calf, back of the knee, thigh, or groin. Redness and swelling in your leg or groin. You have signs of preeclampsia, such as:  Sudden swelling of your face, hands, or feet. New vision problems (such as dimness, blurring, or seeing spots). A severe headache. Watch closely for changes in your health, and be sure to contact your doctor if:    Your vaginal bleeding isn't decreasing. You feel sad, anxious, or hopeless for more than a few days. You are having problems with your breasts or breastfeeding. Where can you learn more? Go to http://www.luevano.com/  Enter A461 in the search box to learn more about \"After Your Delivery (the Postpartum Period): Care Instructions. \"  Current as of: February 23, 2022               Content Version: 13.4  © 2006-2022 [employer]. Care instructions adapted under license by [employer] (which disclaims liability or warranty for this information).  If you have questions about a medical condition or this instruction, always ask your healthcare professional. Arturo Amos disclaims any warranty or liability for your use of this information.

## 2022-11-23 NOTE — ROUTINE PROCESS
1930     SBAR IN Report: Mother    Bedside and Verbal report received from Harleen Dsouza RN (full name & credentials) on this patient, who is now being transferred from L&D (unit) for routine progression of care. Report consisted of patient's Situation, Background, Assessment and Recommendations (SBAR). Information from the SBAR, Intake/Output, MAR, and Recent Results and the Archie Report was reviewed with the transferring nurse; opportunity for questions and clarification provided.

## 2022-11-23 NOTE — DISCHARGE SUMMARY
Obstetrical Discharge Summary     Name: Marilia Corrales MRN: [id number]  SSN: [id number]    YOB: 1985  Age: 40 y.o. Sex: female      Admit Date: 2022    Discharge Date: 2022     Admitting Physician: Myles Kemp MD     Attending Physician:  Dada Hawthorne MD     Admission Diagnoses: Pregnancy [Z34.90]    Discharge Diagnoses:   Information for the patient's :  Shanae Kan [[id number]]   Delivery of a 3.22 kg female infant via [address], [place]ontaneous on 2022 at 5:03 PM  by [facility]. Apgars were 9  and 9 . Additional Diagnoses:   Hospital Problems  Never Reviewed            Codes Class Noted POA    Pregnancy ICD-10-CM: Z34.90  ICD-9-CM: V22.2  2022 Unknown          Lab Results   Component Value Date/Time    Rubella, External non immune 2022 12:00 AM    GrBStrep, External negative 10/26/2022 12:00 AM       Hospital Course: Normal hospital course following the delivery. Patient Instructions:   Current Discharge Medication List        START taking these medications    Details   ibuprofen (MOTRIN) 800 mg tablet Take 1 Tablet by mouth every eight (8) hours. Qty: 30 Tablet, Refills: 0           CONTINUE these medications which have NOT CHANGED    Details   prenatal vit no.124/iron/folic (PRENATAL VITAMIN PO) Take  by mouth. Disposition at Discharge: Home or self care    Condition at Discharge: Stable    Rec fu in 2 [facility] for mood check    Reference my discharge instructions.     Follow-up Appointments   Procedures    FOLLOW UP VISIT Appointment in: 6 Weeks     Standing Status:   Standing     Number of Occurrences:   1     Order Specific Question:   Appointment in     Answer:   6 Weeks        Signed By:  Carlos Langford MD     2022

## 2022-11-23 NOTE — LACTATION NOTE
This note was copied from a baby's chart. Initial Lactation Consultation - Baby born vaginally yesterday to a  mom at 44 6/7 weeks gestation. Mom states she noticed breast changes during her pregnancy and has no medical history negatively affecting her milk production. Mom states she had Covid when she delivered her first child and that baby was in the NICU. Mom was not able to see the baby or put her to the breast. Mom pumped. She said she pumped for 2 months and then her supply dwindles. Mom said baby was getting formula as well as breast milk. Mom said her plan was to exclusively pump and give breastmilk in a bottle. She has been pumping and getting breast milk that she has been giving to the baby. She has also been giving formula to the baby. Mom said she wanted baby to be able to take a bottle so she didn't want to put her to the breast and confuse. We talked about breast and bottle feeding and how babies can do both. She may decide to put baby to the breast. She will call out for assistance as needed. She will continue to pump at least every 3 hours.

## 2022-11-23 NOTE — PROGRESS NOTES
Post-Partum Day Number 1 Progress Note    Catherine Gowers     Assessment: Doing well, post partum day 1    Plan:  - Continue routine postpartum and perineal care as well as maternal education.  - Plan discharge home [facility] Discharge Date: Tomorrow. Information for the patient's :  Aliyah Blas [[id number]]   [address], [place]ontaneous  Patient doing well without significant complaint. Voiding without difficulty, normal lochia. Vitals:  Visit Vitals  /77 (BP 1 Location: Left upper arm, BP Patient Position: At rest;Sitting)   Pulse 85   Temp 97.6 °F (36.4 °C)   Resp 16   Ht 5' 5\" (1.651 m)   Wt 78.5 kg (173 lb)   SpO2 96%   Breastfeeding No   BMI 28.79 kg/m²     Temp (24hrs), Av.3 °F (36.8 °C), Min:97.6 °F (36.4 °C), Max:98.7 °F (37.1 °C)        Exam:   Patient without distress. Fundus firm, nontender per nursing fundal checks. Perineum with normal lochia noted per nursing assessment. Lower extremities are negative for pathological edema. Labs:     Lab Results   Component Value Date/Time    WBC 14.0 (H) 2022 02:37 AM    HGB 10.5 (L) 2022 02:37 AM    HCT 33.3 (L) 2022 02:37 AM    PLATELET 257 3152 02:37 AM       No results found for this or any previous visit (from the past 24 hour(s)).

## 2022-11-23 NOTE — ROUTINE PROCESS
Bedside and Verbal shift change report given to Leena Garcia RN (oncoming nurse) by Nico Nuñez RN (offgoing nurse). Report included the following information SBAR, Intake/Output, MAR, and Recent Results.

## 2022-11-24 VITALS
SYSTOLIC BLOOD PRESSURE: 116 MMHG | DIASTOLIC BLOOD PRESSURE: 77 MMHG | RESPIRATION RATE: 14 BRPM | WEIGHT: 173 LBS | HEART RATE: 74 BPM | BODY MASS INDEX: 28.82 KG/M2 | OXYGEN SATURATION: 98 % | HEIGHT: 65 IN | TEMPERATURE: 98.6 F

## 2022-11-24 PROCEDURE — 74011250637 HC RX REV CODE- 250/637: Performed by: OBSTETRICS & GYNECOLOGY

## 2022-11-24 PROCEDURE — 74011250637 HC RX REV CODE- 250/637: Performed by: ADVANCED PRACTICE MIDWIFE

## 2022-11-24 RX ORDER — ZOLPIDEM TARTRATE 5 MG/1
5 TABLET ORAL ONCE
Status: COMPLETED | OUTPATIENT
Start: 2022-11-24 | End: 2022-11-24

## 2022-11-24 RX ADMIN — ACETAMINOPHEN 650 MG: 325 TABLET ORAL at 00:19

## 2022-11-24 RX ADMIN — ACETAMINOPHEN 650 MG: 325 TABLET ORAL at 10:36

## 2022-11-24 RX ADMIN — IBUPROFEN 800 MG: 400 TABLET, FILM COATED ORAL at 12:35

## 2022-11-24 RX ADMIN — IBUPROFEN 800 MG: 400 TABLET, FILM COATED ORAL at 03:50

## 2022-11-24 RX ADMIN — ZOLPIDEM TARTRATE 5 MG: 5 TABLET ORAL at 02:48

## 2022-11-24 RX ADMIN — DIPHENHYDRAMINE HYDROCHLORIDE 25 MG: 25 CAPSULE ORAL at 00:19

## 2022-11-24 RX ADMIN — DOCUSATE SODIUM 100 MG: 100 CAPSULE, LIQUID FILLED ORAL at 10:36

## 2022-11-24 NOTE — ROUTINE PROCESS
Bedside shift change report given to Sonal Sands RNC (oncoming nurse) by Merton Bloch, RN (offgoing nurse). Report included the following information SBAR.      1315-Pt discharged home with family. Discharge instructions and medication times reviewed. Pt verbalized understanding. Signature obtained on paper and placed on chart. Signature obtained on paper and placed on chart. Pt refused TDAP and MMR vaccines. Pt educated on benefits. EPDS score a 12. Dr. Alexis Verde made aware. Pt will follow-up in 2 weeks.

## 2022-11-24 NOTE — PROGRESS NOTES
Post-Partum Day Number 2 Progress Note    Ocie Form     Assessment: Doing well, post partum day 2    Plan:   - Discharge home today  - Follow up in office in 2 week(s) with [facility].  - Pain medication prescription(s) sent. - Questions answered. - hx depression - consider early PP follow up- reviewed w pt    - rec Tdap and MMR but she declines    Information for the patient's :  Ilene Newsome [[id number]]   [address], [place]aneous  Patient doing well without significant complaint. Voiding without difficulty, normal lochia. Ready for discharge home. Vitals:  Visit Vitals  BP 99/63 (BP 1 Location: Left arm, BP Patient Position: At rest)   Pulse 79   Temp 97.7 °F (36.5 °C)   Resp 16   Ht 5' 5\" (1.651 m)   Wt 78.5 kg (173 lb)   SpO2 98%   Breastfeeding No   BMI 28.79 kg/m²     Temp (24hrs), Av.8 °F (36.6 °C), Min:97.6 °F (36.4 °C), Max:97.9 °F (36.6 °C)      Exam:        Patient without distress. Fundus firm, nontender                Perineum with normal lochia noted per nursing assessment                Lower extremities are negative for pathological edema    Labs:     Lab Results   Component Value Date/Time    WBC 14.0 (H) 2022 02:37 AM    HGB 10.5 (L) 2022 02:37 AM    HCT 33.3 (L) 2022 02:37 AM    PLATELET 979  02:37 AM       No results found for this or any previous visit (from the past 24 hour(s)).

## 2022-11-24 NOTE — PROGRESS NOTES
Bedside and Verbal shift change report given to Sonali Weston RN (oncoming nurse) by Sara Miranda RN (offgoing nurse). Report included the following information SBAR.